# Patient Record
Sex: MALE | Race: WHITE | Employment: OTHER | URBAN - NONMETROPOLITAN AREA
[De-identification: names, ages, dates, MRNs, and addresses within clinical notes are randomized per-mention and may not be internally consistent; named-entity substitution may affect disease eponyms.]

---

## 2017-09-05 ENCOUNTER — OFFICE VISIT (OUTPATIENT)
Dept: CARDIOLOGY CLINIC | Age: 57
End: 2017-09-05
Payer: COMMERCIAL

## 2017-09-05 VITALS
DIASTOLIC BLOOD PRESSURE: 78 MMHG | HEART RATE: 88 BPM | BODY MASS INDEX: 27.27 KG/M2 | WEIGHT: 194.8 LBS | HEIGHT: 71 IN | SYSTOLIC BLOOD PRESSURE: 136 MMHG

## 2017-09-05 DIAGNOSIS — I10 ESSENTIAL HYPERTENSION: ICD-10-CM

## 2017-09-05 DIAGNOSIS — I25.10 CORONARY ARTERY DISEASE INVOLVING NATIVE CORONARY ARTERY OF NATIVE HEART WITHOUT ANGINA PECTORIS: Primary | ICD-10-CM

## 2017-09-05 DIAGNOSIS — E78.01 FAMILIAL HYPERCHOLESTEROLEMIA: ICD-10-CM

## 2017-09-05 PROCEDURE — G8598 ASA/ANTIPLAT THER USED: HCPCS | Performed by: NUCLEAR MEDICINE

## 2017-09-05 PROCEDURE — G8427 DOCREV CUR MEDS BY ELIG CLIN: HCPCS | Performed by: NUCLEAR MEDICINE

## 2017-09-05 PROCEDURE — 99213 OFFICE O/P EST LOW 20 MIN: CPT | Performed by: NUCLEAR MEDICINE

## 2017-09-05 PROCEDURE — G8419 CALC BMI OUT NRM PARAM NOF/U: HCPCS | Performed by: NUCLEAR MEDICINE

## 2017-09-05 PROCEDURE — 1036F TOBACCO NON-USER: CPT | Performed by: NUCLEAR MEDICINE

## 2017-09-05 PROCEDURE — 3017F COLORECTAL CA SCREEN DOC REV: CPT | Performed by: NUCLEAR MEDICINE

## 2018-09-04 ENCOUNTER — OFFICE VISIT (OUTPATIENT)
Dept: CARDIOLOGY CLINIC | Age: 58
End: 2018-09-04
Payer: COMMERCIAL

## 2018-09-04 VITALS
BODY MASS INDEX: 28.42 KG/M2 | WEIGHT: 203 LBS | SYSTOLIC BLOOD PRESSURE: 132 MMHG | HEART RATE: 72 BPM | DIASTOLIC BLOOD PRESSURE: 70 MMHG | HEIGHT: 71 IN

## 2018-09-04 DIAGNOSIS — I10 ESSENTIAL HYPERTENSION: ICD-10-CM

## 2018-09-04 DIAGNOSIS — I25.10 CORONARY ARTERY DISEASE INVOLVING NATIVE CORONARY ARTERY OF NATIVE HEART WITHOUT ANGINA PECTORIS: Primary | ICD-10-CM

## 2018-09-04 PROCEDURE — 3017F COLORECTAL CA SCREEN DOC REV: CPT | Performed by: NUCLEAR MEDICINE

## 2018-09-04 PROCEDURE — G8419 CALC BMI OUT NRM PARAM NOF/U: HCPCS | Performed by: NUCLEAR MEDICINE

## 2018-09-04 PROCEDURE — 99213 OFFICE O/P EST LOW 20 MIN: CPT | Performed by: NUCLEAR MEDICINE

## 2018-09-04 PROCEDURE — G8598 ASA/ANTIPLAT THER USED: HCPCS | Performed by: NUCLEAR MEDICINE

## 2018-09-04 PROCEDURE — 1036F TOBACCO NON-USER: CPT | Performed by: NUCLEAR MEDICINE

## 2018-09-04 PROCEDURE — G8427 DOCREV CUR MEDS BY ELIG CLIN: HCPCS | Performed by: NUCLEAR MEDICINE

## 2018-09-04 RX ORDER — INSULIN GLARGINE 100 [IU]/ML
15 INJECTION, SOLUTION SUBCUTANEOUS NIGHTLY
COMMUNITY

## 2018-09-04 NOTE — PROGRESS NOTES
[address] [address]  Dept: [phone]  Dept Fax: [phone]  Loc: [phone]    Visit Date: 9/4/2018    Maddi Ram is a 61 y.o. male who presents today for:  Chief Complaint   Patient presents with    1 Year Follow Up    Coronary Artery Disease    Hypertension    Hyperlipidemia     No chest pain  Some dyspnea on exertion  Known mild CAD on previous cath   Dyspnea is about the same or some worse  Worse at times  DM for many years since 1993   Cath 2003   BP is stable   On statins       HPI:  HPI  Past Medical History:   Diagnosis Date    CAD (coronary artery disease)     [facility] ([facility]     Hyperlipidemia     Hypertension     Multiple myeloma ([facility]       Past Surgical History:   Procedure Laterality Date    BACK SURGERY  9 2011    CARDIAC CATHETERIZATION  12-19-03    CARDIOVASCULAR STRESS TEST  08-21-09    CHOLECYSTECTOMY  2016    FOOT SURGERY      OTHER SURGICAL HISTORY      stem cell transplant  March 2013    ROTATOR CUFF REPAIR       History reviewed. No pertinent family history. Social History   Substance Use Topics    Smoking status: Former Smoker     Quit date: 1/1/1993    Smokeless tobacco: Never Used    Alcohol use No      Current Outpatient Prescriptions   Medication Sig Dispense Refill    insulin glargine (LANTUS) 100 UNIT/ML injection vial Inject 15 Units into the skin nightly      repaglinide (PRANDIN) 0.5 MG tablet Take 0.5 mg by mouth 3 times daily (before meals)      acyclovir (ZOVIRAX) 400 MG tablet Take 400 mg by mouth every 4 hours (while awake)      Lenalidomide (REVLIMID PO) Take by mouth      aspirin 81 MG chewable tablet Take 325 mg by mouth daily       Exenatide (BYDUREON SC) Inject into the skin once a week       enalapril (VASOTEC) 2.5 MG tablet Take 2.5 mg by mouth daily.         simvastatin (ZOCOR) 80 MG tablet Take 40 mg by mouth nightly       METFORMIN HCL Take 500 mg of ampicillin by mouth 4 times daily       amitriptyline (ELAVIL) 75 MG tablet Take 75 mg by mouth nightly. No current facility-administered medications for this visit. Allergies   Allergen Reactions    Codeine     Pcn [Penicillins]      Health Maintenance   Topic Date Due    Hepatitis C screen  1960    Diabetic foot exam  12/01/1969    Diabetic retinal exam  12/01/1969    Lipid screen  12/01/1969    HIV screen  12/01/1974    Diabetic microalbuminuria test  12/01/1977    DTaP/Tdap/Td vaccine (1 - Tdap) 12/01/1978    Pneumococcal med risk (1 of 1 - PPSV23) 12/01/1978    Shingles Vaccine (1 of 2 - 2 Dose Series) 12/01/2009    Colon cancer screen colonoscopy  12/01/2009    A1C test (Diabetic or Prediabetic)  08/19/2012    Potassium monitoring  09/16/2012    Creatinine monitoring  09/16/2012    Flu vaccine (1) 09/01/2018       Subjective:  Review of Systems  General:   No fever, no chills, No fatigue or weight loss  Pulmonary:    some dyspnea, no wheezing  Cardiac:    Denies recent chest pain,   GI:     No nausea or vomiting, no abdominal pain  Neuro:    No dizziness or light headedness,   Musculoskeletal:  No recent active issues  Extremities:   No edema, good peripheral pulses      Objective:  Physical Exam  /70   Pulse 72   Ht 5' 11\" (1.803 m)   Wt 203 lb (92.1 kg)   BMI 28.31 kg/m²   General:   Well developed, well nourished  Lungs:   Clear to auscultation  Heart:    Normal S1 S2, Slight murmur. no rubs, no gallops  Abdomen:   Soft, non tender, no organomegalies, positive bowel sounds  Extremities:   No edema, no cyanosis, good peripheral pulses  Neurological:   Awake, alert, oriented. No obvious focal deficits  Musculoskelatal:  No obvious deformities    Assessment:      Diagnosis Orders   1. Coronary artery disease involving native coronary artery of native heart without angina pectoris     2.  Essential hypertension     dyspnea is concerning

## 2018-09-19 DIAGNOSIS — I10 ESSENTIAL HYPERTENSION: ICD-10-CM

## 2018-09-19 DIAGNOSIS — I25.10 CORONARY ARTERY DISEASE INVOLVING NATIVE CORONARY ARTERY OF NATIVE HEART WITHOUT ANGINA PECTORIS: ICD-10-CM

## 2019-12-17 ENCOUNTER — OFFICE VISIT (OUTPATIENT)
Dept: CARDIOLOGY CLINIC | Age: 59
End: 2019-12-17
Payer: COMMERCIAL

## 2019-12-17 VITALS
HEART RATE: 56 BPM | SYSTOLIC BLOOD PRESSURE: 140 MMHG | DIASTOLIC BLOOD PRESSURE: 82 MMHG | WEIGHT: 201.8 LBS | HEIGHT: 71 IN | BODY MASS INDEX: 28.25 KG/M2

## 2019-12-17 DIAGNOSIS — E78.01 FAMILIAL HYPERCHOLESTEROLEMIA: ICD-10-CM

## 2019-12-17 DIAGNOSIS — I10 ESSENTIAL HYPERTENSION: Primary | ICD-10-CM

## 2019-12-17 PROCEDURE — G8598 ASA/ANTIPLAT THER USED: HCPCS | Performed by: NUCLEAR MEDICINE

## 2019-12-17 PROCEDURE — 99213 OFFICE O/P EST LOW 20 MIN: CPT | Performed by: NUCLEAR MEDICINE

## 2019-12-17 PROCEDURE — G8484 FLU IMMUNIZE NO ADMIN: HCPCS | Performed by: NUCLEAR MEDICINE

## 2019-12-17 PROCEDURE — G8428 CUR MEDS NOT DOCUMENT: HCPCS | Performed by: NUCLEAR MEDICINE

## 2019-12-17 PROCEDURE — 1036F TOBACCO NON-USER: CPT | Performed by: NUCLEAR MEDICINE

## 2019-12-17 PROCEDURE — G8419 CALC BMI OUT NRM PARAM NOF/U: HCPCS | Performed by: NUCLEAR MEDICINE

## 2019-12-17 PROCEDURE — 3017F COLORECTAL CA SCREEN DOC REV: CPT | Performed by: NUCLEAR MEDICINE

## 2021-01-04 ENCOUNTER — OFFICE VISIT (OUTPATIENT)
Dept: CARDIOLOGY CLINIC | Age: 61
End: 2021-01-04
Payer: COMMERCIAL

## 2021-01-04 VITALS
SYSTOLIC BLOOD PRESSURE: 112 MMHG | HEART RATE: 78 BPM | HEIGHT: 71 IN | DIASTOLIC BLOOD PRESSURE: 62 MMHG | WEIGHT: 174.2 LBS | BODY MASS INDEX: 24.39 KG/M2

## 2021-01-04 DIAGNOSIS — I25.10 CORONARY ARTERY DISEASE INVOLVING NATIVE CORONARY ARTERY OF NATIVE HEART WITHOUT ANGINA PECTORIS: ICD-10-CM

## 2021-01-04 DIAGNOSIS — I10 ESSENTIAL HYPERTENSION: Primary | ICD-10-CM

## 2021-01-04 DIAGNOSIS — E78.01 FAMILIAL HYPERCHOLESTEROLEMIA: ICD-10-CM

## 2021-01-04 PROCEDURE — G8428 CUR MEDS NOT DOCUMENT: HCPCS | Performed by: NUCLEAR MEDICINE

## 2021-01-04 PROCEDURE — G8420 CALC BMI NORM PARAMETERS: HCPCS | Performed by: NUCLEAR MEDICINE

## 2021-01-04 PROCEDURE — 3017F COLORECTAL CA SCREEN DOC REV: CPT | Performed by: NUCLEAR MEDICINE

## 2021-01-04 PROCEDURE — 1036F TOBACCO NON-USER: CPT | Performed by: NUCLEAR MEDICINE

## 2021-01-04 PROCEDURE — 99213 OFFICE O/P EST LOW 20 MIN: CPT | Performed by: NUCLEAR MEDICINE

## 2021-01-04 PROCEDURE — G8484 FLU IMMUNIZE NO ADMIN: HCPCS | Performed by: NUCLEAR MEDICINE

## 2021-01-04 RX ORDER — ACYCLOVIR 400 MG/1
400 TABLET ORAL 2 TIMES DAILY
COMMUNITY
Start: 2020-09-16

## 2021-01-04 NOTE — PROGRESS NOTES
[address] [place] [address].  [address] [address]  Dept: [phone]  Dept Fax: [phone]  Loc: [phone]    Visit Date: 2021    Rik Arambula is a 63 y.o. male who presents todayfor:  Chief Complaint   Patient presents with    1 Year Follow Up    Hypertension    Diabetes    Hyperlipidemia     Had stem cell transplant for MM at [facility]   Does have mild CAD on a cath before  Does have risk for CAD  No chest pain   No changes in breathing  Doing well in general   BP is stable  No dizziness      HPI:  HPI  Past Medical History:   Diagnosis Date    CAD (coronary artery disease)     [facility] mellitus ([facility]     Hyperlipidemia     Hypertension     Multiple myeloma ([facility]       Past Surgical History:   Procedure Laterality Date    BACK SURGERY  2011    CARDIAC CATHETERIZATION  03    CARDIOVASCULAR STRESS TEST  09    CHOLECYSTECTOMY      FOOT SURGERY      OTHER SURGICAL HISTORY      stem cell transplant  2013    ROTATOR CUFF REPAIR       History reviewed. No pertinent family history. Social History     Tobacco Use    Smoking status: Former Smoker     Quit date: 1993     Years since quittin.0    Smokeless tobacco: Never Used   Substance Use Topics    Alcohol use: No      Current Outpatient Medications   Medication Sig Dispense Refill    acyclovir (ZOVIRAX) 400 MG tablet Take 400 mg by mouth 2 times daily      Empagliflozin (JARDIANCE PO) Take by mouth      insulin glargine (LANTUS) 100 UNIT/ML injection vial Inject 15 Units into the skin nightly      repaglinide (PRANDIN) 0.5 MG tablet Take 0.5 mg by mouth 3 times daily (before meals)      aspirin 81 MG chewable tablet Take 325 mg by mouth daily       Exenatide (BYDUREON SC) Inject into the skin once a week       enalapril (VASOTEC) 2.5 MG tablet Take 2.5 mg by mouth daily.         simvastatin (ZOCOR) 80 MG tablet Take 40 mg by mouth nightly       METFORMIN HCL Take 500 mg of ampicillin by mouth 4 times daily       amitriptyline (ELAVIL) 75 MG tablet Take 75 mg by mouth nightly. No current facility-administered medications for this visit. Allergies   Allergen Reactions    Codeine     Pcn [Penicillins]      Health Maintenance   Topic Date Due    Hepatitis C screen  1959    Pneumococcal 0-64 years Vaccine (1 of 1 - PPSV23) 12/01/1965    Diabetic foot exam  12/01/1969    Diabetic retinal exam  12/01/1969    Lipid screen  12/01/1969    HIV screen  12/01/1974    Diabetic microalbuminuria test  12/01/1977    DTaP/Tdap/Td vaccine (1 - Tdap) 12/01/1978    Shingles Vaccine (1 of 2) 12/01/2009    Colon cancer screen colonoscopy  12/01/2009    A1C test (Diabetic or Prediabetic)  08/19/2012    Potassium monitoring  09/16/2012    Creatinine monitoring  09/16/2012    Flu vaccine  Completed    Hepatitis A vaccine  Aged Out    Hib vaccine  Aged Out    Meningococcal (ACWY) vaccine  Aged Out       Subjective:  Review of Systems  General:   No fever, no chills, No fatigue or weight loss  Pulmonary:    No dyspnea, no wheezing  Cardiac:    Denies recent chest pain,   GI:     No nausea or vomiting, no abdominal pain  Neuro:    No dizziness or light headedness,   Musculoskeletal:  No recent active issues  Extremities:   No edema, no obvious claudication       Objective:  Physical Exam  /62   Pulse 78   Ht 5' 11\" (1.803 m)   Wt 174 lb 3.2 oz (79 kg)   BMI 24.30 kg/m²   General:   Well developed, well nourished  Lungs:   Clear to auscultation  Heart:    Normal S1 S2, Slight murmur. no rubs, no gallops  Abdomen:   Soft, non tender, no organomegalies, positive bowel sounds  Extremities:   No edema, no cyanosis, good peripheral pulses  Neurological:   Awake, alert, oriented. No obvious focal deficits  Musculoskelatal:  No obvious deformities    Assessment:      Diagnosis Orders   1. Essential hypertension     2.  Familial hypercholesterolemia     3. Coronary artery disease involving native coronary artery of native heart without angina pectoris     as above  Cardiac fair for now     Plan:  No follow-ups on file. As above  Continue risk factor modification and medical management  Thank you for allowing me to participate in the care of your patient. Please don't hesitate to contact me regarding any further issues related to the patient care    Orders Placed:  No orders of the defined types were placed in this encounter. Medications Prescribed:  No orders of the defined types were placed in this encounter. Discussed use, benefit, and side effects of prescribed medications. All patient questions answered. Pt voicedunderstanding. Instructed to continue current medications, diet and exercise. Continue risk factor modification and medical management. Patient agreed with treatment plan. Follow up as directed.     Electronically signedby Fernando Crowe MD on 1/4/2021 at 9:19 AM

## 2022-02-15 ENCOUNTER — OFFICE VISIT (OUTPATIENT)
Dept: CARDIOLOGY CLINIC | Age: 62
End: 2022-02-15
Payer: COMMERCIAL

## 2022-02-15 VITALS
DIASTOLIC BLOOD PRESSURE: 60 MMHG | BODY MASS INDEX: 26.77 KG/M2 | HEIGHT: 71 IN | SYSTOLIC BLOOD PRESSURE: 134 MMHG | HEART RATE: 82 BPM | WEIGHT: 191.2 LBS

## 2022-02-15 DIAGNOSIS — I10 PRIMARY HYPERTENSION: ICD-10-CM

## 2022-02-15 DIAGNOSIS — E78.01 FAMILIAL HYPERCHOLESTEROLEMIA: ICD-10-CM

## 2022-02-15 DIAGNOSIS — I25.10 CORONARY ARTERY DISEASE INVOLVING NATIVE CORONARY ARTERY OF NATIVE HEART WITHOUT ANGINA PECTORIS: Primary | ICD-10-CM

## 2022-02-15 PROCEDURE — 99213 OFFICE O/P EST LOW 20 MIN: CPT | Performed by: NUCLEAR MEDICINE

## 2022-02-15 RX ORDER — LENALIDOMIDE 10 MG/1
CAPSULE ORAL
COMMUNITY
Start: 2022-02-04

## 2022-02-15 RX ORDER — LENALIDOMIDE 10 MG/1
10 CAPSULE ORAL DAILY
COMMUNITY
Start: 2022-02-02

## 2022-02-15 RX ORDER — ASPIRIN/CALCIUM/MAG/ALUMINUM 325 MG
TABLET ORAL
COMMUNITY

## 2022-02-15 RX ORDER — SEMAGLUTIDE 1.34 MG/ML
INJECTION, SOLUTION SUBCUTANEOUS
COMMUNITY
Start: 2022-01-28

## 2022-02-15 NOTE — PROGRESS NOTES
[address] [address] [address] [facility],4Th [facility] [address] [facility]  Dept: [phone]  Dept Fax: [phone]  Loc: [phone]    Visit Date: 2/15/2022    Isaiah Freeman is a 57 y.o. male who presents todayfor:  Chief Complaint   Patient presents with    1 Year Follow Up     essential  hypertension    Coronary Artery Disease    Hyperlipidemia   known mild CAD  No chest pain   No changes in breathing  Had sten cell transplant for MM  Doing well   Followed at [facility]   BP is stable  No dizziness  No syncope        HPI:  HPI  Past Medical History:   Diagnosis Date    CAD (coronary artery disease)     Diabetes mellitus ([facility]     Hyperlipidemia     Hypertension     Multiple myeloma ([facility]       Past Surgical History:   Procedure Laterality Date    BACK SURGERY  2011    CARDIAC CATHETERIZATION  03    CARDIOVASCULAR STRESS TEST  09    CHOLECYSTECTOMY      FOOT SURGERY      HERNIA REPAIR      OTHER SURGICAL HISTORY      stem cell transplant  2013    ROTATOR CUFF REPAIR       No family history on file.   Social History     Tobacco Use    Smoking status: Former Smoker     Quit date: 1993     Years since quittin.1    Smokeless tobacco: Never Used   Substance Use Topics    Alcohol use: No      Current Outpatient Medications   Medication Sig Dispense Refill    Aspirin Buf,[id number], (BUFFERED ASPIRIN) 325 MG TABS Take by mouth      OZEMPIC, 0.25 OR 0.5 MG/DOSE, 2 MG/1.5ML SOPN INJECT 0.25MG SUBCUTANEOUSLY ONCE WEEKLY FOR 2 WEEKS, THEN 0.5MG ONCE WEEKLY FOR 4 WEEKS      lenalidomide (REVLIMID) 10 MG chemo capsule Take 10 mg by mouth daily      acyclovir (ZOVIRAX) 400 MG tablet Take 400 mg by mouth 2 times daily      Empagliflozin (JARDIANCE PO) Take by mouth      insulin glargine (LANTUS) 100 UNIT/ML injection vial Inject 15 Units into the skin nightly      repaglinide (PRANDIN) 0.5 MG tablet Take 0.5 mg by mouth 3 times daily (before meals)      enalapril (VASOTEC) 2.5 MG tablet Take 2.5 mg by mouth daily.  simvastatin (ZOCOR) 80 MG tablet Take 40 mg by mouth nightly       METFORMIN HCL Take 500 mg of ampicillin by mouth 4 times daily       amitriptyline (ELAVIL) 75 MG tablet Take 75 mg by mouth nightly.  REVLIMID 10 MG chemo capsule        No current facility-administered medications for this visit. Allergies   Allergen Reactions    Codeine     Pcn [Penicillins]      Health Maintenance   Topic Date Due    Hepatitis C screen  Never done    Pneumococcal 0-64 years Vaccine (1 of 2 - PPSV23) Never done    Diabetic foot exam  Never done    Lipid screen  Never done    Depression Screen  Never done    HIV screen  Never done    Diabetic microalbuminuria test  Never done    Diabetic retinal exam  Never done    Colon cancer screen colonoscopy  Never done    Shingles Vaccine (1 of 2) Never done    Potassium monitoring  09/16/2012    Creatinine monitoring  09/16/2012    A1C test (Diabetic or Prediabetic)  11/23/2021    DTaP/Tdap/Td vaccine (2 - Td or Tdap) 03/16/2031    Flu vaccine  Completed    COVID-19 Vaccine  Completed    Hepatitis A vaccine  Aged Out    Hib vaccine  Aged Out    Meningococcal (ACWY) vaccine  Aged Out       Subjective:  Review of Systems  General:   No fever, no chills, No fatigue or weight loss  Pulmonary:    No dyspnea, no wheezing  Cardiac:    Denies recent chest pain,   GI:     No nausea or vomiting, no abdominal pain  Neuro:    No dizziness or light headedness,   Musculoskeletal:  No recent active issues  Extremities:   No edema, no obvious claudication       Objective:  Physical Exam  /60   Pulse 82   Ht 5' 11\" (1.803 m)   Wt 191 lb 3.2 oz (86.7 kg)   BMI 26.67 kg/m²   General:   Well developed, well nourished  Lungs:   Clear to auscultation  Heart:    Normal S1 S2, Slight murmur.  no rubs, no gallops  Abdomen:   Soft, non tender, no organomegalies, positive bowel sounds  Extremities:   No edema, no cyanosis, good peripheral pulses  Neurological:   Awake, alert, oriented. No obvious focal deficits  Musculoskelatal:  No obvious deformities    Assessment:      Diagnosis Orders   1. Coronary artery disease involving native coronary artery of native heart without angina pectoris     2. Primary hypertension     3. Familial hypercholesterolemia     as above  Cardiac fair for no w      Plan:  No follow-ups on file. As above  Continue risk factor modification and medical management    Thank you for allowing me to participate in the care of your patient. Please don't hesitate to contact me regarding any further issues related to the patient care    Orders Placed:  No orders of the defined types were placed in this encounter. Medications Prescribed:  No orders of the defined types were placed in this encounter. Discussed use, benefit, and side effects of prescribed medications. All patient questions answered. Pt voicedunderstanding. Instructed to continue current medications, diet and exercise. Continue risk factor modification and medical management. Patient agreed with treatment plan. Follow up as directed.     Electronically signedby Ton Rivera MD on 2/15/2022 at 12:30 PM

## 2022-02-15 NOTE — PROGRESS NOTES
Pt C/O dizziness every once in a while.  Some fatigued      Pt denies CP, SOB, Headache,heart palpitations, swelling

## 2023-02-21 ENCOUNTER — OFFICE VISIT (OUTPATIENT)
Dept: CARDIOLOGY CLINIC | Age: 63
End: 2023-02-21
Payer: COMMERCIAL

## 2023-02-21 VITALS
BODY MASS INDEX: 25.62 KG/M2 | WEIGHT: 183 LBS | SYSTOLIC BLOOD PRESSURE: 144 MMHG | HEIGHT: 71 IN | DIASTOLIC BLOOD PRESSURE: 62 MMHG | HEART RATE: 84 BPM

## 2023-02-21 DIAGNOSIS — I10 PRIMARY HYPERTENSION: Primary | ICD-10-CM

## 2023-02-21 DIAGNOSIS — E78.01 FAMILIAL HYPERCHOLESTEROLEMIA: ICD-10-CM

## 2023-02-21 DIAGNOSIS — I25.10 CORONARY ARTERY DISEASE INVOLVING NATIVE CORONARY ARTERY OF NATIVE HEART WITHOUT ANGINA PECTORIS: ICD-10-CM

## 2023-02-21 PROCEDURE — 3077F SYST BP >= 140 MM HG: CPT | Performed by: NUCLEAR MEDICINE

## 2023-02-21 PROCEDURE — 99213 OFFICE O/P EST LOW 20 MIN: CPT | Performed by: NUCLEAR MEDICINE

## 2023-02-21 PROCEDURE — 3078F DIAST BP <80 MM HG: CPT | Performed by: NUCLEAR MEDICINE

## 2023-02-21 RX ORDER — EXENATIDE 2 MG/.65ML
INJECTION, SUSPENSION, EXTENDED RELEASE SUBCUTANEOUS
COMMUNITY

## 2023-02-21 NOTE — PROGRESS NOTES
[address] [facility],4Th Floor [address] [facility]  Dept: [phone]  Dept Fax: [phone]  Loc: [phone]    Visit Date: 2023    Gerry Freeman is a 60 y.o. male who presents todayfor:  Chief Complaint   Patient presents with    Check-Up    Coronary Artery Disease    Hypertension    Hyperlipidemia   Know MM and two time stem cell   On medical Rx  Cath before mild CAD  No chest pain   No changes in breathing  BP is stable   some dizziness  No syncope        HPI:  HPI  Past Medical History:   Diagnosis Date    CAD (coronary artery disease)     Diabetes mellitus ([facility] [address]     Hyperlipidemia     Hypertension     Multiple myeloma ([facility]       Past Surgical History:   Procedure Laterality Date    BACK SURGERY  2011    CARDIAC CATHETERIZATION  03    CARDIOVASCULAR STRESS TEST  09    CHOLECYSTECTOMY  2016    FOOT SURGERY      HERNIA REPAIR      OTHER SURGICAL HISTORY      stem cell transplant  2013    OTHER SURGICAL HISTORY      stem cell transplant- mylodysplastic    ROTATOR CUFF REPAIR       No family history on file.   Social History     Tobacco Use    Smoking status: Former     Types: Cigarettes     Quit date: 1993     Years since quittin.1    Smokeless tobacco: Never   Substance Use Topics    Alcohol use: No      Current Outpatient Medications   Medication Sig Dispense Refill    Exenatide (BYDUREON) 2 MG PEN Inject into the skin      Aspirin Buf,RiUsw-[id number]-PoOys, (BUFFERED ASPIRIN) 325 MG TABS Take by mouth      OZEMPIC, 0.25 OR 0.5 MG/DOSE, 2 MG/1.5ML SOPN INJECT 0.25MG SUBCUTANEOUSLY ONCE WEEKLY FOR 2 WEEKS, THEN 0.5MG ONCE WEEKLY FOR 4 WEEKS      lenalidomide (REVLIMID) 10 MG chemo capsule Take 10 mg by mouth daily      acyclovir (ZOVIRAX) 400 MG tablet Take 400 mg by mouth 2 times daily      Empagliflozin (JARDIANCE PO) Take 25 mg by mouth daily      insulin glargine (LANTUS) 100 UNIT/ML injection vial Inject 20 Units into the skin nightly      repaglinide (PRANDIN) 0.5 MG tablet Take 0.5 mg by mouth 3 times daily (before meals)      enalapril (VASOTEC) 2.5 MG tablet Take 2.5 mg by mouth daily. simvastatin (ZOCOR) 80 MG tablet Take 40 mg by mouth nightly       METFORMIN HCL Take 500 mg of ampicillin by mouth 4 times daily       amitriptyline (ELAVIL) 75 MG tablet Take 75 mg by mouth nightly. No current facility-administered medications for this visit. Allergies   Allergen Reactions    Codeine     Pcn [Penicillins]      Health Maintenance   Topic Date Due    Pneumococcal 0-64 years Vaccine (1 - PCV) Never done    Diabetic foot exam  Never done    Depression Screen  Never done    HIV screen  Never done    Diabetic Alb to Cr ratio (uACR) test  Never done    Diabetic retinal exam  Never done    Hepatitis C screen  Never done    Colorectal Cancer Screen  Never done    Shingles vaccine (1 of 2) Never done    GFR test (Diabetes, CKD 3-4, OR last GFR 15-59)  09/16/2012    Lipids  03/11/2021    A1C test (Diabetic or Prediabetic)  11/23/2021    DTaP/Tdap/Td vaccine (2 - Td or Tdap) 03/16/2031    Flu vaccine  Completed    COVID-19 Vaccine  Completed    Hepatitis A vaccine  Aged Out    Hib vaccine  Aged Out    Meningococcal (ACWY) vaccine  Aged Out       Subjective:  General:   No fever, no chills, No fatigue or weight loss  Pulmonary:    No dyspnea, no wheezing  Cardiac:    Denies recent chest pain,   GI:     No nausea or vomiting, no abdominal pain  Neuro:    No dizziness or light headedness,   Musculoskeletal:  No recent active issues  Extremities:   No edema, no obvious claudication       Objective:  General:   Well developed, well nourished  Lungs:   Clear to auscultation  Heart:    Normal S1 S2, Slight murmur. no rubs, no gallops  Abdomen:   Soft, non tender, no organomegalies, positive bowel sounds  Extremities:   No edema, no cyanosis, good peripheral pulses  Neurological:   Awake, alert, oriented.  No obvious focal deficits  Musculoskelatal:  No obvious deformities   BP (!) 144/62   Pulse 84   Ht 5' 11\" (1.803 m)   Wt 183 lb (83 kg)   BMI 25.52 kg/m²     Assessment:      Diagnosis Orders   1. Primary hypertension        2. Familial hypercholesterolemia        3. Coronary artery disease involving native coronary artery of native heart without angina pectoris        As above  Cardiac fair for now     Plan:  No follow-ups on file. As above  Continue risk factor modification and medical management  Thank you for allowing me to participate in the care of your patient. Please don't hesitate to contact me regarding any further issues related to the patient care    Orders Placed:  No orders of the defined types were placed in this encounter. Prescribed:  No orders of the defined types were placed in this encounter. Discussed use, benefit, and side effects of prescribed medications. All patient questions answered. Pt voicedunderstanding. Instructed to continue current medications, diet and exercise. Continue risk factor modification and medical management. Patient agreed with treatment plan. Follow up as directed.     Electronically signedby Naomi Gloria MD on 2/21/2023 at 11:49 AM

## 2024-06-18 ENCOUNTER — OFFICE VISIT (OUTPATIENT)
Dept: CARDIOLOGY CLINIC | Age: 64
End: 2024-06-18
Payer: COMMERCIAL

## 2024-06-18 VITALS
SYSTOLIC BLOOD PRESSURE: 142 MMHG | BODY MASS INDEX: 24.41 KG/M2 | DIASTOLIC BLOOD PRESSURE: 64 MMHG | HEIGHT: 71 IN | HEART RATE: 86 BPM | WEIGHT: 174.38 LBS

## 2024-06-18 DIAGNOSIS — I10 PRIMARY HYPERTENSION: Primary | ICD-10-CM

## 2024-06-18 DIAGNOSIS — E78.01 FAMILIAL HYPERCHOLESTEROLEMIA: ICD-10-CM

## 2024-06-18 PROCEDURE — 3077F SYST BP >= 140 MM HG: CPT | Performed by: NUCLEAR MEDICINE

## 2024-06-18 PROCEDURE — 3078F DIAST BP <80 MM HG: CPT | Performed by: NUCLEAR MEDICINE

## 2024-06-18 PROCEDURE — 99213 OFFICE O/P EST LOW 20 MIN: CPT | Performed by: NUCLEAR MEDICINE

## 2024-06-18 NOTE — PROGRESS NOTES
[facility] [place] [facility]  [facility]  [address] [place] [address]  Dept: [phone]  Dept Fax: [phone]  Loc: [phone]    Visit Date: 2024    Christine Vargas is a 63 y.o. male who presents todayfor:  Chief Complaint   Patient presents with    1 Year Follow Up    Coronary Artery Disease    Hypertension     Known MM and stem cell   Seems stable  Cath before with mild CAD  No chest pain   No changes in breathing  BP is stable  No dizziness  No syncope  On statins for hyperlipidemia      HPI:  HPI  Past Medical History:   Diagnosis Date    CAD (coronary artery disease)     Diabetes mellitus (HCC)     Hyperlipidemia     Hypertension     Multiple myeloma (HCC)       Past Surgical History:   Procedure Laterality Date    BACK SURGERY  2011    CARDIAC CATHETERIZATION  03    CARDIOVASCULAR STRESS TEST  09    CHOLECYSTECTOMY      CT BIOPSY PERCUTANEOUS DEEP BONE  2015    CT BIOPSY PERCUTANEOUS DEEP BONE 2015    FOOT SURGERY      HERNIA REPAIR      OTHER SURGICAL HISTORY      stem cell transplant  2013    OTHER SURGICAL HISTORY      stem cell transplant- mylodysplastic    ROTATOR CUFF REPAIR       No family history on file.  Social History     Tobacco Use    Smoking status: Former     Current packs/day: 0.00     Types: Cigarettes     Quit date: 1993     Years since quittin.4    Smokeless tobacco: Never   Substance Use Topics    Alcohol use: No      Current Outpatient Medications   Medication Sig Dispense Refill    Aspirin Buf,[id number]-[id number], (BUFFERED ASPIRIN) 325 MG TABS Take by mouth      OZEMPIC, 0.25 OR 0.5 MG/DOSE, 2 MG/1.5ML SOPN INJECT 0.25MG SUBCUTANEOUSLY ONCE WEEKLY FOR 2 WEEKS, THEN 0.5MG ONCE WEEKLY FOR 4 WEEKS      lenalidomide (REVLIMID) 10 MG chemo capsule Take 1 capsule by mouth daily      acyclovir (ZOVIRAX) 400 MG tablet Take 1 tablet by mouth 2 times daily      Empagliflozin (JARDIANCE PO)

## 2025-06-24 ENCOUNTER — OFFICE VISIT (OUTPATIENT)
Dept: CARDIOLOGY CLINIC | Age: 65
End: 2025-06-24
Payer: MEDICARE

## 2025-06-24 VITALS
HEART RATE: 91 BPM | WEIGHT: 167.8 LBS | SYSTOLIC BLOOD PRESSURE: 109 MMHG | DIASTOLIC BLOOD PRESSURE: 68 MMHG | HEIGHT: 71 IN | BODY MASS INDEX: 23.49 KG/M2

## 2025-06-24 DIAGNOSIS — I95.0 IDIOPATHIC HYPOTENSION: ICD-10-CM

## 2025-06-24 DIAGNOSIS — E78.01 FAMILIAL HYPERCHOLESTEROLEMIA: Primary | ICD-10-CM

## 2025-06-24 DIAGNOSIS — I25.10 CORONARY ARTERY DISEASE INVOLVING NATIVE CORONARY ARTERY OF NATIVE HEART WITHOUT ANGINA PECTORIS: ICD-10-CM

## 2025-06-24 PROCEDURE — 3078F DIAST BP <80 MM HG: CPT | Performed by: NUCLEAR MEDICINE

## 2025-06-24 PROCEDURE — G8428 CUR MEDS NOT DOCUMENT: HCPCS | Performed by: NUCLEAR MEDICINE

## 2025-06-24 PROCEDURE — 3074F SYST BP LT 130 MM HG: CPT | Performed by: NUCLEAR MEDICINE

## 2025-06-24 PROCEDURE — 99214 OFFICE O/P EST MOD 30 MIN: CPT | Performed by: NUCLEAR MEDICINE

## 2025-06-24 PROCEDURE — G8420 CALC BMI NORM PARAMETERS: HCPCS | Performed by: NUCLEAR MEDICINE

## 2025-06-24 PROCEDURE — 4004F PT TOBACCO SCREEN RCVD TLK: CPT | Performed by: NUCLEAR MEDICINE

## 2025-06-24 PROCEDURE — 1123F ACP DISCUSS/DSCN MKR DOCD: CPT | Performed by: NUCLEAR MEDICINE

## 2025-06-24 PROCEDURE — 3017F COLORECTAL CA SCREEN DOC REV: CPT | Performed by: NUCLEAR MEDICINE

## 2025-06-24 NOTE — PROGRESS NOTES
[facility] [place] [facility]  [facility] C[facility]OLOG[facility]  [address] [place] [address]  Dept: [phone]  Dept Fax: [phone]  Loc: [phone]    Visit Date: 2025    Christine Vargas is a 64 y.o. male who presents todayfor:  Chief Complaint   Patient presents with    Coronary Artery Disease    Hyperlipidemia    Dizziness   Know MM and stem cell  Know mild CAD  No chest pain   Some baseline dyspnea  Some dizziness  No syncope  Looks like low BP  On statins for hyperlipidemia  No issues with the mds  DM is stable   On medical Rx as well         HPI:  HPI  Past Medical History:   Diagnosis Date    CAD (coronary artery disease)     Diabetes mellitus (HCC)     Hyperlipidemia     Hypertension     Multiple myeloma (HCC)       Past Surgical History:   Procedure Laterality Date    BACK SURGERY  2011    CARDIAC CATHETERIZATION  03    CARDIOVASCULAR STRESS TEST  09    CHOLECYSTECTOMY      CT BIOPSY DEEP BONE PERCUTANEOUS  2015    CT BIOPSY PERCUTANEOUS DEEP BONE 2015    FOOT SURGERY      HERNIA REPAIR      OTHER SURGICAL HISTORY      stem cell transplant  2013    OTHER SURGICAL HISTORY      stem cell transplant- mylodysplastic    ROTATOR CUFF REPAIR       No family history on file.  Social History     Tobacco Use    Smoking status: Former     Current packs/day: 0.00     Types: Cigarettes     Quit date: 1993     Years since quittin.4    Smokeless tobacco: Never   Substance Use Topics    Alcohol use: No      Current Outpatient Medications   Medication Sig Dispense Refill    Aspirin Buf,KrOqe-AeWya-KjCow, (BUFFERED ASPIRIN) 325 MG TABS Take by mouth      OZEMPIC, 0.25 OR 0.5 MG/DOSE, 2 MG/1.5ML SOPN INJECT 0.25MG SUBCUTANEOUSLY ONCE WEEKLY FOR 2 WEEKS, THEN 0.5MG ONCE WEEKLY FOR 4 WEEKS      lenalidomide (REVLIMID) 10 MG chemo capsule Take 1 capsule by mouth daily      acyclovir (ZOVIRAX) 400 MG tablet Take 1 tablet by mouth 2 times daily

## 2025-06-25 ENCOUNTER — TELEPHONE (OUTPATIENT)
Dept: CARDIOLOGY CLINIC | Age: 65
End: 2025-06-25

## 2025-06-25 NOTE — TELEPHONE ENCOUNTER
Schedule for cardio and echo on 7/1/25, arrival time 8:45 am at [facility]    Call to pt, date, time and instructions reviewed with pt and [facility]

## 2025-07-02 ENCOUNTER — TELEPHONE (OUTPATIENT)
Dept: CARDIOLOGY CLINIC | Age: 65
End: 2025-07-02

## 2025-07-02 DIAGNOSIS — E78.01 FAMILIAL HYPERCHOLESTEROLEMIA: ICD-10-CM

## 2025-07-02 DIAGNOSIS — I95.0 IDIOPATHIC HYPOTENSION: ICD-10-CM

## 2025-07-02 DIAGNOSIS — I25.10 CORONARY ARTERY DISEASE INVOLVING NATIVE CORONARY ARTERY OF NATIVE HEART WITHOUT ANGINA PECTORIS: ICD-10-CM

## 2025-07-02 NOTE — TELEPHONE ENCOUNTER
Okay. Looked good both    [id number]:2 weeks|| ||00\01||False; [id number]:2 weeks|| ||00\01||False; [id number]:2 weeks|| ||00\01||False;

## 2025-07-03 NOTE — TELEPHONE ENCOUNTER
Pt notified.  At the OV on 6/24/25 Vasotec was held until results.  He is asking if he should restart?